# Patient Record
Sex: FEMALE | Race: ASIAN | NOT HISPANIC OR LATINO | URBAN - NONMETROPOLITAN AREA
[De-identification: names, ages, dates, MRNs, and addresses within clinical notes are randomized per-mention and may not be internally consistent; named-entity substitution may affect disease eponyms.]

---

## 2021-10-10 ENCOUNTER — TELEPHONE (OUTPATIENT)
Dept: URGENT CARE | Facility: CLINIC | Age: 41
End: 2021-10-10

## 2021-10-10 NOTE — TELEPHONE ENCOUNTER
Patient called requesting acyclovir cream for shingles rash. She was seen yesterday and prescribed po valtrex which is the appropriate  treatment for shingles. A topical antiviral is not effective and will not add to the relief or treatment.